# Patient Record
Sex: MALE | Race: OTHER | NOT HISPANIC OR LATINO
[De-identification: names, ages, dates, MRNs, and addresses within clinical notes are randomized per-mention and may not be internally consistent; named-entity substitution may affect disease eponyms.]

---

## 2018-01-29 ENCOUNTER — TRANSCRIPTION ENCOUNTER (OUTPATIENT)
Age: 23
End: 2018-01-29

## 2023-01-11 PROBLEM — Z00.00 ENCOUNTER FOR PREVENTIVE HEALTH EXAMINATION: Status: ACTIVE | Noted: 2023-01-11

## 2023-02-01 ENCOUNTER — NON-APPOINTMENT (OUTPATIENT)
Age: 28
End: 2023-02-01

## 2023-02-02 ENCOUNTER — APPOINTMENT (OUTPATIENT)
Dept: OTOLARYNGOLOGY | Facility: CLINIC | Age: 28
End: 2023-02-02
Payer: MEDICAID

## 2023-02-02 VITALS
BODY MASS INDEX: 26.66 KG/M2 | TEMPERATURE: 96.6 F | HEIGHT: 69 IN | WEIGHT: 180 LBS | SYSTOLIC BLOOD PRESSURE: 135 MMHG | DIASTOLIC BLOOD PRESSURE: 87 MMHG | HEART RATE: 71 BPM

## 2023-02-02 PROCEDURE — 99204 OFFICE O/P NEW MOD 45 MIN: CPT

## 2023-02-02 NOTE — CONSULT LETTER
[Dear  ___] : Dear  [unfilled], [Courtesy Letter:] : I had the pleasure of seeing your patient, [unfilled], in my office today. [Consult Closing:] : Thank you very much for allowing me to participate in the care of this patient.  If you have any questions, please do not hesitate to contact me. [Sincerely,] : Sincerely, [FreeTextEntry3] : Rafal Sharp MD\par Department of Otolaryngology - Head and Neck Surgery\par [facility]

## 2023-02-02 NOTE — HISTORY OF PRESENT ILLNESS
[de-identified] : 27M here for initial evaluation.\par \par He c/o nasal obstruction/congestion as if his nose is always clogged. The right side is always much worse than the left and nasal breathing is usually much worse at night when both sides can completely be blocked.\par There are mild seasonal allergies and he takes zyrtec rarely as needed. He takes saline. The other nasal sprays do not help.\par No imaging. \par \par ROS otherwise unremarkable.

## 2023-02-02 NOTE — ASSESSMENT
[FreeTextEntry1] : 27M here for initial evaluation. He c/o nasal obstruction/congestion as if his nose is always clogged. The right side is always much worse than the left and nasal breathing is usually much worse at night when both sides can completely be blocked. There are mild seasonal allergies and he takes zyrtec rarely as needed. He takes saline. The other nasal sprays do not help. On exam, rhinoscopy shows severe right nasal septal deviation involving the caudal and dorsal strut w near complete right nasal airway obstruction.\par His nasal congestion is due to obvious, severe right septal deviation which involves the nasal valve. Left side gets worse due to turbinate hypertrophy. Next step is CT sinus and RTO for review and formulate plan. Given septal deformity involving the valve, will send to my plastics colleague for evaluation as well.

## 2023-02-02 NOTE — PROCEDURE
[FreeTextEntry3] : Rhinoscopy:\par -severe right nasal septal deviation involving caudal and dorsal strut w near complete right nasal airway obstruction and valve stenosis\par -left inferior turbinate hypertrophy

## 2023-02-16 ENCOUNTER — TRANSCRIPTION ENCOUNTER (OUTPATIENT)
Age: 28
End: 2023-02-16

## 2023-02-24 ENCOUNTER — APPOINTMENT (OUTPATIENT)
Dept: OTOLARYNGOLOGY | Facility: CLINIC | Age: 28
End: 2023-02-24
Payer: MEDICAID

## 2023-02-24 VITALS — WEIGHT: 180 LBS | HEIGHT: 69 IN | TEMPERATURE: 96.8 F | BODY MASS INDEX: 26.66 KG/M2

## 2023-02-24 DIAGNOSIS — J34.2 DEVIATED NASAL SEPTUM: ICD-10-CM

## 2023-02-24 DIAGNOSIS — J34.89 OTHER SPECIFIED DISORDERS OF NOSE AND NASAL SINUSES: ICD-10-CM

## 2023-02-24 PROCEDURE — 99213 OFFICE O/P EST LOW 20 MIN: CPT

## 2023-02-24 NOTE — ASSESSMENT
[FreeTextEntry1] : 27M here in followup. He c/o nasal obstruction/congestion as if his nose is always clogged. The right side is always much worse than the left and nasal breathing is usually much worse at night when both sides can completely be blocked. There are mild seasonal allergies and he takes zyrtec rarely as needed. He takes saline. The other nasal sprays do not help. CT sinus shows severe right cartilaginous septal deviation w valve stenosis and spurring extending posteriorly. On exam, rhinoscopy shows severe right nasal septal deviation involving the caudal and dorsal strut w near complete right nasal airway obstruction.\par His nasal congestion is due to obvious, severe right septal deviation which involves the caudal septum and nasal valve. Left side gets worse due to turbinate hypertrophy. Given septal deformity involving the valve, will send to my facial plastics colleague for definitive treatment.

## 2023-02-24 NOTE — HISTORY OF PRESENT ILLNESS
[de-identified] : 27M here in followup.\par \par He c/o nasal obstruction/congestion as if his nose is always clogged. The right side is always much worse than the left and nasal breathing is usually much worse at night when both sides can completely be blocked.\par There are mild seasonal allergies and he takes zyrtec rarely as needed. He takes saline. The other nasal sprays do not help.\par \par CT Sinus 2/18/23 (I reviewed images):\par -severe right cartilaginous septal deviation w valve stenosis and spurring extending posteriorly\par -paranasal sinuses clear\par \par ROS otherwise unremarkable.

## 2023-02-24 NOTE — CONSULT LETTER
[Dear  ___] : Dear  [unfilled], [Courtesy Letter:] : I had the pleasure of seeing your patient, [unfilled], in my office today. [Consult Closing:] : Thank you very much for allowing me to participate in the care of this patient.  If you have any questions, please do not hesitate to contact me. [Sincerely,] : Sincerely, [FreeTextEntry3] : Rafal Sharp MD\par Department of Otolaryngology - Head and Neck Surgery\par [facility]